# Patient Record
Sex: FEMALE | Race: ASIAN | Employment: FULL TIME | URBAN - METROPOLITAN AREA
[De-identification: names, ages, dates, MRNs, and addresses within clinical notes are randomized per-mention and may not be internally consistent; named-entity substitution may affect disease eponyms.]

---

## 2017-08-09 PROCEDURE — 82607 VITAMIN B-12: CPT | Performed by: INTERNAL MEDICINE

## 2017-08-09 PROCEDURE — 81001 URINALYSIS AUTO W/SCOPE: CPT | Performed by: INTERNAL MEDICINE

## 2017-08-16 PROCEDURE — 87077 CULTURE AEROBIC IDENTIFY: CPT | Performed by: OBSTETRICS & GYNECOLOGY

## 2017-08-16 PROCEDURE — 87070 CULTURE OTHR SPECIMN AEROBIC: CPT | Performed by: OBSTETRICS & GYNECOLOGY

## 2017-08-16 PROCEDURE — 87510 GARDNER VAG DNA DIR PROBE: CPT | Performed by: OBSTETRICS & GYNECOLOGY

## 2017-08-16 PROCEDURE — 87480 CANDIDA DNA DIR PROBE: CPT | Performed by: OBSTETRICS & GYNECOLOGY

## 2017-08-16 PROCEDURE — 87660 TRICHOMONAS VAGIN DIR PROBE: CPT | Performed by: OBSTETRICS & GYNECOLOGY

## 2018-03-29 PROBLEM — S90.852A FOREIGN BODY IN LEFT FOOT, INITIAL ENCOUNTER: Status: ACTIVE | Noted: 2018-03-29

## 2018-03-29 PROBLEM — E66.9 OBESITY (BMI 30.0-34.9): Status: ACTIVE | Noted: 2018-03-29

## 2019-07-24 PROBLEM — L72.3 SEBACEOUS CYST: Status: ACTIVE | Noted: 2019-07-24

## 2020-04-24 PROBLEM — Z97.5 IUD (INTRAUTERINE DEVICE) IN PLACE: Status: ACTIVE | Noted: 2020-04-24

## 2020-04-24 PROBLEM — N89.8 VAGINAL ODOR: Status: ACTIVE | Noted: 2020-04-24

## 2020-09-02 PROBLEM — Z20.822 COVID-19 VIRUS RNA TEST RESULT UNKNOWN: Status: ACTIVE | Noted: 2020-09-02

## 2021-01-20 PROBLEM — E88.81 INSULIN RESISTANCE: Status: ACTIVE | Noted: 2021-01-20

## 2021-01-20 PROBLEM — R79.89 LOW SERUM LEPTIN: Status: ACTIVE | Noted: 2021-01-20

## 2021-01-20 PROBLEM — E66.09 CLASS 2 OBESITY DUE TO EXCESS CALORIES WITHOUT SERIOUS COMORBIDITY WITH BODY MASS INDEX (BMI) OF 36.0 TO 36.9 IN ADULT: Status: ACTIVE | Noted: 2021-01-20

## 2021-09-22 ENCOUNTER — HOSPITAL ENCOUNTER (OUTPATIENT)
Age: 36
Discharge: HOME | End: 2021-09-22
Payer: COMMERCIAL

## 2021-09-22 VITALS
RESPIRATION RATE: 16 BRPM | DIASTOLIC BLOOD PRESSURE: 83 MMHG | OXYGEN SATURATION: 100 % | SYSTOLIC BLOOD PRESSURE: 135 MMHG | TEMPERATURE: 98 F | HEART RATE: 75 BPM

## 2021-09-22 DIAGNOSIS — S90.212A CONTUSION OF LEFT GREAT TOE WITH DAMAGE TO NAIL, INITIAL ENCOUNTER: Primary | ICD-10-CM

## 2021-09-22 PROCEDURE — 99203 OFFICE O/P NEW LOW 30 MIN: CPT | Performed by: NURSE PRACTITIONER

## 2021-09-22 RX ORDER — CEPHALEXIN 500 MG/1
500 CAPSULE ORAL 4 TIMES DAILY
Qty: 40 CAPSULE | Refills: 0 | Status: SHIPPED | OUTPATIENT
Start: 2021-09-22 | End: 2021-10-02

## 2021-09-22 NOTE — ED INITIAL ASSESSMENT (HPI)
Patient is here with pain and drainage coming from her left great toe after having a piece of furniture fall on her left great toe.

## 2021-09-22 NOTE — ED PROVIDER NOTES
Patient Seen in: [facility]    History   Patient presents with:  Leg or Foot Injury: Furniture dropped on big toe. Nail has started to detach from nail bed.  Puss is starting to come out. - Entered by patient    Stated Complaint: Leg or Foot • Diabetes Mother    • Hypertension Maternal Grandmother    • Stroke Maternal Grandfather    • Diabetes Paternal Grandmother    • Heart Attack Sister    • Musculo-skelatal Disorder Paternal Uncle         MD   • Thyroid disease Paternal Aunt        Social verbalized understanding and agreement with the plan. I explained to the patient that emergent conditions may arise and to go to the ER for new, worsening or any persistent conditions.  I've explained the importance of taking all medicatons as prescribed,

## 2021-09-22 NOTE — ED QUICK NOTES
Left great toe irrigated with normal saline, patient tolerated. Toe is slightly red in color and she complaints of pain and throbbing.